# Patient Record
Sex: MALE | Race: WHITE
[De-identification: names, ages, dates, MRNs, and addresses within clinical notes are randomized per-mention and may not be internally consistent; named-entity substitution may affect disease eponyms.]

---

## 2021-09-26 ENCOUNTER — HOSPITAL ENCOUNTER (EMERGENCY)
Dept: HOSPITAL 95 - ER | Age: 61
LOS: 1 days | End: 2021-09-27
Payer: COMMERCIAL

## 2021-09-26 VITALS — BODY MASS INDEX: 33.86 KG/M2 | HEIGHT: 72 IN | WEIGHT: 250 LBS

## 2021-09-26 DIAGNOSIS — R73.9: ICD-10-CM

## 2021-09-26 DIAGNOSIS — I46.9: Primary | ICD-10-CM

## 2021-09-26 DIAGNOSIS — U07.1: ICD-10-CM

## 2021-09-26 LAB
CA-I BLD-SCNC: 1.1 MMOL/L (ref 1.1–1.46)
CHLORIDE BLD-SCNC: 101 MMOL/L (ref 98–108)
CO2 BLD-SCNC: 15 MMOL/L (ref 21–32)
CREAT BLD-MCNC: 1.2 MG/DL (ref 0.8–1.3)
GLUCOSE BLDC GLUCOMTR-MCNC: 460 MG/DL (ref 70–99)
HCT VFR BLD CALC: 45 % (ref 41–53)
HGB BLD CALC-MCNC: 15.3 G/DL (ref 13.5–17.5)
POTASSIUM BLD-SCNC: 4 MMOL/L (ref 3.5–5.5)
SODIUM BLD-SCNC: 135 MMOL/L (ref 135–148)

## 2021-09-26 NOTE — NUR
Call back - TOD
 
Pt's son Venu was exited the room of the . Condolences offered.
Venu wanted to leave the premises though he spoke to the RN briefly. I
entered the room and provided a prayer on behalf of pt and son. Venu
appeared to be grieving appropriately, though time with him was limited.